# Patient Record
Sex: FEMALE | Race: WHITE | NOT HISPANIC OR LATINO | Employment: PART TIME | URBAN - METROPOLITAN AREA
[De-identification: names, ages, dates, MRNs, and addresses within clinical notes are randomized per-mention and may not be internally consistent; named-entity substitution may affect disease eponyms.]

---

## 2017-02-07 ENCOUNTER — CONVERSION ENCOUNTER (OUTPATIENT)
Dept: GENERAL RADIOLOGY | Facility: HOSPITAL | Age: 67
End: 2017-02-07

## 2018-07-27 ENCOUNTER — CONVERSION ENCOUNTER (OUTPATIENT)
Dept: GENERAL RADIOLOGY | Facility: HOSPITAL | Age: 68
End: 2018-07-27

## 2018-07-31 ENCOUNTER — OFFICE VISIT CONVERTED (OUTPATIENT)
Dept: SURGERY | Facility: CLINIC | Age: 68
End: 2018-07-31
Attending: SURGERY

## 2018-07-31 ENCOUNTER — CONVERSION ENCOUNTER (OUTPATIENT)
Dept: SURGERY | Facility: CLINIC | Age: 68
End: 2018-07-31

## 2019-09-25 ENCOUNTER — HOSPITAL ENCOUNTER (OUTPATIENT)
Dept: GENERAL RADIOLOGY | Facility: HOSPITAL | Age: 69
Discharge: HOME | End: 2019-09-25
Attending: SURGERY

## 2019-10-07 ENCOUNTER — OFFICE VISIT CONVERTED (OUTPATIENT)
Dept: SURGERY | Facility: CLINIC | Age: 69
End: 2019-10-07
Attending: SURGERY

## 2019-10-07 ENCOUNTER — CONVERSION ENCOUNTER (OUTPATIENT)
Dept: SURGERY | Facility: CLINIC | Age: 69
End: 2019-10-07

## 2020-06-02 ENCOUNTER — CONVERSION ENCOUNTER (OUTPATIENT)
Dept: ORTHOPEDIC SURGERY | Facility: CLINIC | Age: 70
End: 2020-06-02

## 2020-06-02 ENCOUNTER — OFFICE VISIT CONVERTED (OUTPATIENT)
Dept: ORTHOPEDIC SURGERY | Facility: CLINIC | Age: 70
End: 2020-06-02
Attending: ORTHOPAEDIC SURGERY

## 2020-09-28 ENCOUNTER — CONVERSION ENCOUNTER (OUTPATIENT)
Dept: ORTHOPEDIC SURGERY | Facility: CLINIC | Age: 70
End: 2020-09-28

## 2020-09-28 ENCOUNTER — OFFICE VISIT CONVERTED (OUTPATIENT)
Dept: ORTHOPEDIC SURGERY | Facility: CLINIC | Age: 70
End: 2020-09-28
Attending: ORTHOPAEDIC SURGERY

## 2020-10-05 ENCOUNTER — HOSPITAL ENCOUNTER (OUTPATIENT)
Dept: GENERAL RADIOLOGY | Facility: HOSPITAL | Age: 70
Discharge: HOME | End: 2020-10-05
Attending: SURGERY

## 2020-10-06 ENCOUNTER — HOSPITAL ENCOUNTER (OUTPATIENT)
Dept: PREADMISSION TESTING | Facility: HOSPITAL | Age: 70
Discharge: HOME | End: 2020-10-06
Attending: ORTHOPAEDIC SURGERY

## 2020-10-06 LAB
ALBUMIN SERPL-MCNC: 4 G/DL (ref 3.5–5)
ALBUMIN/GLOB SERPL: 1.3 {RATIO} (ref 1.4–2.6)
ALP SERPL-CCNC: 72 U/L (ref 43–160)
ALT SERPL-CCNC: 18 U/L (ref 10–40)
ANION GAP SERPL CALC-SCNC: 16 MMOL/L (ref 8–19)
APTT BLD: 25.5 S (ref 22.2–34.2)
AST SERPL-CCNC: 20 U/L (ref 15–50)
BASOPHILS # BLD AUTO: 0.11 10*3/UL (ref 0–0.2)
BASOPHILS NFR BLD AUTO: 1.1 % (ref 0–3)
BILIRUB SERPL-MCNC: 0.44 MG/DL (ref 0.2–1.3)
BUN SERPL-MCNC: 12 MG/DL (ref 5–25)
BUN/CREAT SERPL: 16 {RATIO} (ref 6–20)
CALCIUM SERPL-MCNC: 10.4 MG/DL (ref 8.7–10.4)
CHLORIDE SERPL-SCNC: 97 MMOL/L (ref 99–111)
CONV ABS IMM GRAN: 0.04 10*3/UL (ref 0–0.2)
CONV CO2: 29 MMOL/L (ref 22–32)
CONV IMMATURE GRAN: 0.4 % (ref 0–1.8)
CONV TOTAL PROTEIN: 7 G/DL (ref 6.3–8.2)
CREAT UR-MCNC: 0.77 MG/DL (ref 0.5–0.9)
DEPRECATED RDW RBC AUTO: 42.7 FL (ref 36.4–46.3)
EOSINOPHIL # BLD AUTO: 0.43 10*3/UL (ref 0–0.7)
EOSINOPHIL # BLD AUTO: 4.4 % (ref 0–7)
ERYTHROCYTE [DISTWIDTH] IN BLOOD BY AUTOMATED COUNT: 12.4 % (ref 11.7–14.4)
EST. AVERAGE GLUCOSE BLD GHB EST-MCNC: 123 MG/DL
GFR SERPLBLD BASED ON 1.73 SQ M-ARVRAT: >60 ML/MIN/{1.73_M2}
GLOBULIN UR ELPH-MCNC: 3 G/DL (ref 2–3.5)
GLUCOSE SERPL-MCNC: 118 MG/DL (ref 65–99)
HBA1C MFR BLD: 5.9 % (ref 3.5–5.7)
HCT VFR BLD AUTO: 41 % (ref 37–47)
HGB BLD-MCNC: 13.8 G/DL (ref 12–16)
INR PPP: 0.91 (ref 2–3)
LYMPHOCYTES # BLD AUTO: 2.08 10*3/UL (ref 1–5)
LYMPHOCYTES NFR BLD AUTO: 21.5 % (ref 20–45)
MCH RBC QN AUTO: 31.4 PG (ref 27–31)
MCHC RBC AUTO-ENTMCNC: 33.7 G/DL (ref 33–37)
MCV RBC AUTO: 93.2 FL (ref 81–99)
MONOCYTES # BLD AUTO: 1 10*3/UL (ref 0.2–1.2)
MONOCYTES NFR BLD AUTO: 10.3 % (ref 3–10)
NEUTROPHILS # BLD AUTO: 6.03 10*3/UL (ref 2–8)
NEUTROPHILS NFR BLD AUTO: 62.3 % (ref 30–85)
NRBC CBCN: 0 % (ref 0–0.7)
OSMOLALITY SERPL CALC.SUM OF ELEC: 289 MOSM/KG (ref 273–304)
PLATELET # BLD AUTO: 302 10*3/UL (ref 130–400)
PMV BLD AUTO: 10.1 FL (ref 9.4–12.3)
POTASSIUM SERPL-SCNC: 2.9 MMOL/L (ref 3.5–5.3)
PROTHROMBIN TIME: 10 S (ref 9.4–12)
RBC # BLD AUTO: 4.4 10*6/UL (ref 4.2–5.4)
SODIUM SERPL-SCNC: 139 MMOL/L (ref 135–147)
WBC # BLD AUTO: 9.69 10*3/UL (ref 4.8–10.8)

## 2020-10-22 ENCOUNTER — HOSPITAL ENCOUNTER (OUTPATIENT)
Dept: PREADMISSION TESTING | Facility: HOSPITAL | Age: 70
Discharge: HOME | End: 2020-10-22
Attending: ORTHOPAEDIC SURGERY

## 2020-10-24 LAB — SARS-COV-2 RNA SPEC QL NAA+PROBE: NOT DETECTED

## 2020-10-27 ENCOUNTER — HOSPITAL ENCOUNTER (OUTPATIENT)
Dept: PERIOP | Facility: HOSPITAL | Age: 70
Setting detail: HOSPITAL OUTPATIENT SURGERY
Discharge: HOME | End: 2020-10-28
Attending: INTERNAL MEDICINE

## 2020-10-28 LAB
ANION GAP SERPL CALC-SCNC: 15 MMOL/L (ref 8–19)
BUN SERPL-MCNC: 21 MG/DL (ref 5–25)
BUN/CREAT SERPL: 19 {RATIO} (ref 6–20)
CALCIUM SERPL-MCNC: 10.3 MG/DL (ref 8.7–10.4)
CHLORIDE SERPL-SCNC: 101 MMOL/L (ref 99–111)
CONV CO2: 27 MMOL/L (ref 22–32)
CREAT UR-MCNC: 1.13 MG/DL (ref 0.5–0.9)
GFR SERPLBLD BASED ON 1.73 SQ M-ARVRAT: 49 ML/MIN/{1.73_M2}
GLUCOSE SERPL-MCNC: 137 MG/DL (ref 65–99)
HCT VFR BLD AUTO: 37.1 % (ref 37–47)
HGB BLD-MCNC: 12.3 G/DL (ref 12–16)
OSMOLALITY SERPL CALC.SUM OF ELEC: 291 MOSM/KG (ref 273–304)
POTASSIUM SERPL-SCNC: 4.7 MMOL/L (ref 3.5–5.3)
SODIUM SERPL-SCNC: 138 MMOL/L (ref 135–147)

## 2020-11-09 ENCOUNTER — OFFICE VISIT CONVERTED (OUTPATIENT)
Dept: ORTHOPEDIC SURGERY | Facility: CLINIC | Age: 70
End: 2020-11-09
Attending: PHYSICIAN ASSISTANT

## 2020-12-07 ENCOUNTER — OFFICE VISIT CONVERTED (OUTPATIENT)
Dept: ORTHOPEDIC SURGERY | Facility: CLINIC | Age: 70
End: 2020-12-07
Attending: PHYSICIAN ASSISTANT

## 2021-01-08 ENCOUNTER — OFFICE VISIT CONVERTED (OUTPATIENT)
Dept: ORTHOPEDIC SURGERY | Facility: CLINIC | Age: 71
End: 2021-01-08
Attending: PHYSICIAN ASSISTANT

## 2021-02-22 ENCOUNTER — OFFICE VISIT CONVERTED (OUTPATIENT)
Dept: ORTHOPEDIC SURGERY | Facility: CLINIC | Age: 71
End: 2021-02-22
Attending: PHYSICIAN ASSISTANT

## 2021-05-10 NOTE — H&P
"   History and Physical      Patient Name: Darlene Monroe   Patient ID: [id number]   Sex: Female   YOB: 1950    Primary Care Provider: Cecily Marc   Referring Provider: Coleman Dyer MD    Visit Date: June 2, 2020    Provider: Yemi Coto MD   Location: [facility]   Location Address: [address] [place] [address]  [id number]   Location Phone: [phone]          Chief Complaint  · left knee pain      History Of Present Illness  Darlene Monroe is a 70 year old /White female who presents today to [facility]. Patient presents for evaluation of left knee pain, patient states she has had years of left knee pain but states it is worsened over the last 3 months. Patient states she has had injections in the past from a doctor in [place] several years ago with relief. Patient states she has had worse pain in the knee recently with pain \"interfering with life \". Patient states she takes Tylenol which does not help the pain. She has used a compression sleeve with some relief. Patient denies buckling catching or locking of the knee. Patient admits to medial knee pain, pain with stairs, pain with activities, going from sitting to standing. Patient admits to occasional swelling. Patient denies injury.       Past Medical History  Arthritis; Asthma; Breast CA; Hyperlipemia; Hypertension; Limb Swelling; Seasonal allergies; Shortness of Breath         Past Surgical History  Breast biopsy, both breasts; Cholecystectomy; Colonoscopy; Gallbladder         Medication List  atorvastatin oral; biotin 10,000 mcg oral tablet,disintegrating; cetirizine oral; chlorthalidone 25 mg oral tablet; Flonase Sensimist 27.5 mcg/actuation nasal spray,suspension; losartan-hydrochlorothiazide oral; One A Day Vitamin oral; ProAir HFA 90 mcg/actuation inhalation HFA aerosol inhaler; Singulair oral; vitamin B12-folic acid 500-400 mcg oral tablet; Vitamin D3 1,000 unit oral capsule         Allergy " "List  CT Contrast Dye; I.V. Dye       Allergies Reconciled  Family Medical History  Stroke; *Non Contributory; Osteoporosis         Social History  Alcohol Use (Never); Claustophobic (Unknown); lives alone; Recreational Drug Use (Never); Retired.; Single.; Tobacco (Never)         Review of Systems  · Constitutional  o Denies  o : fever, chills, weight loss  · Cardiovascular  o Denies  o : chest pain, shortness of breath  · Gastrointestinal  o Denies  o : liver disease, heartburn, nausea, blood in stools  · Genitourinary  o Denies  o : painful urination, blood in urine  · Integument  o Denies  o : rash, itching  · Neurologic  o Denies  o : headache, weakness, loss of consciousness  · Musculoskeletal  o Denies  o : painful, swollen joints  · Psychiatric  o Denies  o : drug/alcohol addiction, anxiety, depression      Vitals  Date Time BP Position Site L\R Cuff Size HR RR TEMP (F) WT  HT  BMI kg/m2 BSA m2 O2 Sat        06/02/2020 04:11 PM         210lbs 0oz 5'  2\" 38.41 2.04           Physical Examination  · Constitutional  o Appearance  o : well developed, well-nourished, no obvious deformities present  · Head and Face  o Head  o :   § Inspection  § : normocephalic  o Face  o :   § Inspection  § : no facial lesions  · Eyes  o Conjunctivae  o : conjunctivae normal  o Sclerae  o : sclerae white  · Ears, Nose, Mouth and Throat  o Ears  o :   § External Ears  § : appearance within normal limits  § Hearing  § : intact  o Nose  o :   § External Nose  § : appearance normal  · Neck  o Inspection/Palpation  o : normal appearance  o Range of Motion  o : full range of motion  · Respiratory  o Respiratory Effort  o : breathing unlabored  o Inspection of Chest  o : normal appearance  o Auscultation of Lungs  o : no audible wheezing or rales  · Cardiovascular  o Heart  o : regular rate  · Gastrointestinal  o Abdominal Examination  o : soft and non-tender  · Skin and Subcutaneous Tissue  o General Inspection  o : intact, no " rashes  · Psychiatric  o General  o : Alert and oriented x3  o Judgement and Insight  o : judgment and insight intact  o Mood and Affect  o : mood normal, affect appropriate  · Left Knee  o Inspection  o : Skin is intact, no redness, mild swelling, no ecchymosis, no signs of infection. Full extension, flexion 120, stable anterior/posterior drawer, stable to varus/valgus stress, pain with range of motion.  · Injection Note/Aspiration Note  o Site  o : left knee  o Procedure  o : Procedure: After educating the patient, patient gave consent for procedure. After using Chloraprep, the joint space was injected. The patient tolerated the procedure well.  o Medication  o : 80 mg of DepoMedrol with 9cc of 1% Lidocaine  · In Office Procedures  o View  o : AP/LATERAL/SUNRISE   o Site  o : left, knee  o Indication  o : Left knee pain   o Study  o : X-rays ordered, taken in the office, and reviewed today.  o Xray  o : Advanced degenerative changes, medial joint space narrowing, tricompartmental osteophytes.  o Comparative Data  o : No comparative data found          Assessment  · Primary osteoarthritis of left knee     715.16/M17.12  · Left knee pain     719.46/M25.562      Plan  · Orders  o Depo-Medrol injection 80mg () - 715.16/M17.12 - 06/03/2020   Lot [id number] Exp 05 2021 [employer] Administered by ROWAN PERALTA MD  o Knee Intra-articular Injection without US Guidance [facility] (40863) - 715.16/M17.12 - 06/03/2020   Lot [id number] Exp 07 01 2021 [employer] Administered by ROWAN PERALTA MD  · Medications  o Medications have been Reconciled  o Transition of Care or Provider Policy  · Instructions  o Reviewed the patient's Past Medical, Social, and Family history as well as the ROS at today's visit, no changes.  o Call or return if worsening symptoms.  o Follow up in 2 months.  o The above service was scribed by Hua Malone PA-C on my behalf and I attest to the accuracy of the note. jsb  o Reviewed x-rays with  patient, discussed diagnosis and treatment plan. Patient is candidate for left knee replacement, patient would like to avoid surgery. Patient elected to have left knee steroid injection and she tolerated it well. Follow-up in 8 weeks for reevaluation.            Electronically Signed by: Jaye Prescott - , Other -Author on June 5, 2020 02:35:40 PM  Electronically Co-signed by: Yemi Coto MD -Reviewer on June 7, 2020 09:01:20 PM

## 2021-05-13 NOTE — PROGRESS NOTES
Progress Note      Patient Name: Darlene Monroe   Patient ID: [id number]   Sex: Female   YOB: 1950    Primary Care Provider: Cecily Marc   Referring Provider: Coleamn Dyer MD    Visit Date: September 28, 2020    Provider: Varun Batista MD   Location: [facility]   Location Address: [address] [place] [address]  [id number]   Location Phone: [phone]          Chief Complaint  · Left Knee Pain      History Of Present Illness  Darlene Monroe is a 70 year old /White female who presents today to [facility].      Patient presents today with a chief complaint of left knee pain. Patient has a history of left knee OA and states that pain is affecting her activities of daily living. Patient states that she has pain with walking and bending of her knees. Patient states she takes Tylenol which does not help the pain. She has used a compression sleeve with some relief. Patient denies any trauma or injury to her knee.            Past Medical History  Arthritis; Asthma; Breast CA; Hyperlipemia; Hypertension; Limb Swelling; Seasonal allergies; Shortness of Breath         Past Surgical History  Breast biopsy, both breasts; Cholecystectomy; Colonoscopy; Gallbladder         Medication List  atorvastatin oral; biotin 10,000 mcg oral tablet,disintegrating; cetirizine oral; chlorthalidone 25 mg oral tablet; Flonase Sensimist 27.5 mcg/actuation nasal spray,suspension; losartan-hydrochlorothiazide oral; One A Day Vitamin oral; ProAir HFA 90 mcg/actuation inhalation HFA aerosol inhaler; Singulair oral; vitamin B12-folic acid 500-400 mcg oral tablet; Vitamin D3 1,000 unit oral capsule         Allergy List  CT Contrast Dye; I.V. Dye       Allergies Reconciled  Family Medical History  Stroke; *Non Contributory; Osteoporosis         Social History  Alcohol Use (Never); Claustophobic (Unknown); lives alone; Recreational Drug Use (Never); Retired.; Single.; Tobacco (Never)         Review  "of Systems  · Constitutional  o Denies  o : fever, chills, weight loss  · Cardiovascular  o Denies  o : chest pain, shortness of breath  · Gastrointestinal  o Denies  o : liver disease, heartburn, nausea, blood in stools  · Genitourinary  o Denies  o : painful urination, blood in urine  · Integument  o Denies  o : rash, itching  · Neurologic  o Denies  o : headache, weakness, loss of consciousness  · Musculoskeletal  o Denies  o : painful, swollen joints  · Psychiatric  o Denies  o : drug/alcohol addiction, anxiety, depression      Vitals  Date Time BP Position Site L\R Cuff Size HR RR TEMP (F) WT  HT  BMI kg/m2 BSA m2 O2 Sat        09/28/2020 09:01 AM         210lbs 0oz 5'  2\" 38.41 2.04           Physical Examination  · Constitutional  o Appearance  o : well developed, well-nourished, no obvious deformities present  · Head and Face  o Head  o :   § Inspection  § : normocephalic  o Face  o :   § Inspection  § : no facial lesions  · Eyes  o Conjunctivae  o : conjunctivae normal  o Sclerae  o : sclerae white  · Ears, Nose, Mouth and Throat  o Ears  o :   § External Ears  § : appearance within normal limits  § Hearing  § : intact  o Nose  o :   § External Nose  § : appearance normal  · Neck  o Inspection/Palpation  o : normal appearance  o Range of Motion  o : full range of motion  · Respiratory  o Respiratory Effort  o : breathing unlabored  o Inspection of Chest  o : normal appearance  o Auscultation of Lungs  o : no audible wheezing or rales  · Cardiovascular  o Heart  o : regular rate  · Gastrointestinal  o Abdominal Examination  o : soft and non-tender  · Skin and Subcutaneous Tissue  o General Inspection  o : intact, no rashes  · Psychiatric  o General  o : Alert and oriented x3  o Judgement and Insight  o : judgment and insight intact  o Mood and Affect  o : mood normal, affect appropriate  · Left Knee  o Inspection  o : Limping gait. Medial joint line tenderness. Lateral joint line tenderness. Full extension " and flexion. Positive for crepitus. Sensation grossly intact. Neurovascular intact. Pulses are pleasant. No swelling, skin discoloration or atrophy. Stable to valgus/varus stress. Good strength in quadriceps, hamstrings, dorsiflexors, and plantar flexors.   · Imaging  o Imaging  o : [XRAY 6/2/2020] Advanced degenerative changes, medial joint space narrowing, tricompartmental osteophytes.           Assessment  · Primary osteoarthritis of left knee     715.16/M17.12  · Left knee pain, unspecified chronicity     719.46/M25.562      Plan  · Medications  o Medications have been Reconciled  o Transition of Care or Provider Policy  · Instructions  o Dr. Batista saw and examined the patient and agrees with plan.   o X-rays reviewed by Dr. Batista.  o Reviewed the patient's Past Medical, Social, and Family history as well as the ROS at today's visit, no changes.  o Call or return if worsening symptoms.  o Discussed surgery.  o Risks/benefits discussed with patient including, but not limited to: infection, bleeding, neurovascular damage, malunion, nonunion, aesthetic deformity, need for further surgery, and death.  o Discussed with patient the implant type being used during surgery and patient understands and desires to proceed.  o Surgery pamphlet given.  o This note was transcribed by Candie Pham. [email or web address]  o Discussed diagnosis and treatment options with the patient. Discussed surgical interventions risks and benefits. Patient opted for left total knee arthroplasty. Patient will follow-up post-op.             Electronically Signed by: Candie Pham-, Other -Author on September 28, 2020 11:39:00 AM  Electronically Co-signed by: Varun Batista MD -Reviewer on September 28, 2020 11:47:16 AM

## 2021-05-13 NOTE — PROGRESS NOTES
Progress Note      Patient Name: Darlene Monroe   Patient ID: [id number]   Sex: Female   YOB: 1950    Primary Care Provider: Cecily Marc   Referring Provider: Varun Batista MD    Visit Date: November 9, 2020    Provider: Arti Gray PA-C   Location: [facility]   Location Address: [address] [place] [address]  [id number]   Location Phone: [phone]          Chief Complaint  · Left knee pain      History Of Present Illness  Darlene Monroe is a 70 year old /White female who presents today to [facility].      Patient is status post left total knee arthroplasty performed 10/27/20 by Dr. Batista. Patient states pain is manageable with OTC Tylenol. Patient denies calf pain. Patient states Home Health is providing physical therapy. Patient is using a walker.       Past Medical History  Arthritis; Asthma; Breast CA; Hyperlipemia; Hypertension; Limb Swelling; Seasonal allergies; Shortness of Breath         Past Surgical History  Breast biopsy, both breasts; Cholecystectomy; Colonoscopy; Gallbladder         Medication List  atorvastatin oral; biotin 10,000 mcg oral tablet,disintegrating; cetirizine oral; chlorthalidone 25 mg oral tablet; Flonase Sensimist 27.5 mcg/actuation nasal spray,suspension; losartan-hydrochlorothiazide oral; One A Day Vitamin oral; ProAir HFA 90 mcg/actuation inhalation HFA aerosol inhaler; Singulair oral; Tylenol-Codeine #3 300-30 mg oral tablet; vitamin B12-folic acid 500-400 mcg oral tablet; Vitamin D3 1,000 unit oral capsule         Allergy List  CT Contrast Dye; I.V. Dye         Family Medical History  Stroke; *Non Contributory; Osteoporosis         Social History  Alcohol Use (Never); Claustophobic (Unknown); lives alone; Recreational Drug Use (Never); Retired.; Single.; Tobacco (Never)         Review of Systems  · Constitutional  o Denies  o : fever, chills, weight loss  · Cardiovascular  o Denies  o : chest pain, shortness of  "breath  · Gastrointestinal  o Denies  o : liver disease, heartburn, nausea, blood in stools  · Genitourinary  o Denies  o : painful urination, blood in urine  · Integument  o Denies  o : rash, itching  · Neurologic  o Denies  o : headache, weakness, loss of consciousness  · Musculoskeletal  o Denies  o : painful, swollen joints  · Psychiatric  o Denies  o : drug/alcohol addiction, anxiety, depression      Vitals  Date Time BP Position Site L\R Cuff Size HR RR TEMP (F) WT  HT  BMI kg/m2 BSA m2 O2 Sat FR L/min FiO2 HC       11/09/2020 02:38 PM      67 - R   210lbs 0oz 5'  2\" 38.41 2.04 97 %            Physical Examination  · Constitutional  o Appearance  o : well developed, well-nourished, no obvious deformities present  · Head and Face  o Head  o :   § Inspection  § : normocephalic  o Face  o :   § Inspection  § : no facial lesions  · Eyes  o Conjunctivae  o : conjunctivae normal  o Sclerae  o : sclerae white  · Ears, Nose, Mouth and Throat  o Ears  o :   § External Ears  § : appearance within normal limits  § Hearing  § : intact  o Nose  o :   § External Nose  § : appearance normal  · Neck  o Inspection/Palpation  o : normal appearance  o Range of Motion  o : full range of motion  · Respiratory  o Respiratory Effort  o : breathing unlabored  o Inspection of Chest  o : normal appearance  o Auscultation of Lungs  o : no audible wheezing or rales  · Cardiovascular  o Heart  o : regular rate  · Gastrointestinal  o Abdominal Examination  o : soft and non-tender  · Skin and Subcutaneous Tissue  o General Inspection  o : intact, no rashes  · Psychiatric  o General  o : Alert and oriented x3  o Judgement and Insight  o : judgment and insight intact  o Mood and Affect  o : mood normal, affect appropriate  · In Office Procedures  o View  o : AP/LATERAL/SUNRISE  o Site  o : left, knee  o Indication  o : Left knee pain  o Study  o : X-rays ordered, taken in the office, and reviewed today.  o Xray  o : stable " implant  o Comparative Data  o : Comparative Data found and reviewed today   · Left Knee-Leslie  o Inspection  o : incision well healed without signs of infection, limping gait, weight bearing, swelling, no ecchymosis, no atrophy, neutral alignment  o Palpation  o : no medial joint line tenderness, no lateral joint line tenderness, no patellar tendon tenderness, no pain of MCL, no pain at LCL  o ROM  o : full extension, full flexion  o Strength  o : weak extension, weak flexion   o Special Tests  o : negative Hunter's sign  o Neurovascular  o : Full sensation, Dorsal Pedal Pulse 2+, posteriror tibialis pulse 2+          Assessment  · Aftercare;following joint replacement     V54.81/Z47.1  · Left knee pain, unspecified chronicity     719.46/M25.562      Plan  · Orders  o Knee (Left) [facility] ([id number]) - 719.46/M25.562 - 11/09/2020  · Medications  o Medications have been Reconciled  o Transition of Care or Provider Policy  · Instructions  o Staples removed in clinic today.  o Reviewed the patient's Past Medical, Social, and Family history as well as the ROS at today's visit, no changes.  o Call or return if worsening symptoms.  o X-ray ordered, taken and reviewed at this visit.  o Follow Up in 4 weeks.   o Electronically Identified Patient Education Materials Provided Electronically            Electronically Signed by: APRIL Ling-C -Author on November 9, 2020 03:19:08 PM  Electronically Co-signed by: Varun Batista MD -Reviewer on November 11, 2020 08:53:27 PM

## 2021-05-13 NOTE — PROGRESS NOTES
Progress Note      Patient Name: Darlene Monroe   Patient ID: [id number]   Sex: Female   YOB: 1950    Primary Care Provider: Cecily Marc    Visit Date: December 7, 2020    Provider: Arti Gray PA-C   Location: [facility]   Location Address: [address] [place] [address]  [id number]   Location Phone: [phone]          Chief Complaint  · Left Total Knee Arthroplasty      History Of Present Illness  Darlene Monroe is a 70 year old /White female who presents today to [facility].      Patient is status post left total knee arthroplasty performed 10/27/20 by Dr. Batista. Patient states pain is manageable with OTC Tylenol. Patient denies calf pain. Patient states Home Health is providing physical therapy. Patient is using a walker.            Past Medical History  Arthritis; Asthma; Breast CA; Hyperlipemia; Hypertension; Limb Swelling; Seasonal allergies; Shortness of Breath         Past Surgical History  Breast biopsy, both breasts; Cholecystectomy; Colonoscopy; Gallbladder         Medication List  atorvastatin oral; biotin 10,000 mcg oral tablet,disintegrating; cetirizine oral; chlorthalidone 25 mg oral tablet; Flonase Sensimist 27.5 mcg/actuation nasal spray,suspension; losartan-hydrochlorothiazide oral; One A Day Vitamin oral; ProAir HFA 90 mcg/actuation inhalation HFA aerosol inhaler; Singulair oral; Tylenol-Codeine #3 300-30 mg oral tablet; vitamin B12-folic acid 500-400 mcg oral tablet; Vitamin D3 1,000 unit oral capsule         Allergy List  CT Contrast Dye; I.V. Dye         Family Medical History  Stroke; *Non Contributory; Osteoporosis         Social History  Alcohol Use (Never); Claustophobic (Unknown); lives alone; Recreational Drug Use (Never); Retired.; Single.; Tobacco (Never)         Review of Systems  · Constitutional  o Denies  o : fever, chills, weight loss  · Cardiovascular  o Denies  o : chest pain, shortness of  "breath  · Gastrointestinal  o Denies  o : liver disease, heartburn, nausea, blood in stools  · Genitourinary  o Denies  o : painful urination, blood in urine  · Integument  o Denies  o : rash, itching  · Neurologic  o Denies  o : headache, weakness, loss of consciousness  · Musculoskeletal  o Denies  o : painful, swollen joints  · Psychiatric  o Denies  o : drug/alcohol addiction, anxiety, depression      Vitals  Date Time BP Position Site L\R Cuff Size HR RR TEMP (F) WT  HT  BMI kg/m2 BSA m2 O2 Sat FR L/min FiO2 HC       12/07/2020 02:35 PM         210lbs 0oz 5'  2\" 38.41 2.04             Physical Examination  · Constitutional  o Appearance  o : well developed, well-nourished, no obvious deformities present  · Head and Face  o Head  o :   § Inspection  § : normocephalic  o Face  o :   § Inspection  § : no facial lesions  · Eyes  o Conjunctivae  o : conjunctivae normal  o Sclerae  o : sclerae white  · Ears, Nose, Mouth and Throat  o Ears  o :   § External Ears  § : appearance within normal limits  § Hearing  § : intact  o Nose  o :   § External Nose  § : appearance normal  · Neck  o Inspection/Palpation  o : normal appearance  o Range of Motion  o : full range of motion  · Respiratory  o Respiratory Effort  o : breathing unlabored  o Inspection of Chest  o : normal appearance  o Auscultation of Lungs  o : no audible wheezing or rales  · Cardiovascular  o Heart  o : regular rate  · Gastrointestinal  o Abdominal Examination  o : soft and non-tender  · Skin and Subcutaneous Tissue  o General Inspection  o : intact, no rashes  · Psychiatric  o General  o : Alert and oriented x3  o Judgement and Insight  o : judgment and insight intact  o Mood and Affect  o : mood normal, affect appropriate  · Left Knee-Street  o Inspection  o : scar well healed, limping gait, weight bearing, mild swelling, no ecchymosis, no atrophy, neutral alignment  o Palpation  o : no medial joint line tenderness, no lateral joint line tenderness, no " patellar tendon tenderness, no pain of MCL, no pain at LCL  o ROM  o : -5 extension, 95 flexion  o Strength  o : weak extension, weak flexion   o Special Tests  o : negative varus stress, negaitve valgus stress  o Neurovascular  o : Full sensation, Dorsal Pedal Pulse 2+, posteriror tibialis pulse 2+          Assessment  · Aftercare following left knee joint replacement surgery       Aftercare following joint replacement surgery     V54.81/Z47.1  Presence of left artificial knee joint     V54.81/Z96.652      Plan  · Medications  o Medications have been Reconciled  o Transition of Care or Provider Policy  · Instructions  o Reviewed the patient's Past Medical, Social, and Family history as well as the ROS at today's visit, no changes.  o Call or return if worsening symptoms.  o Follow Up in 4 weeks.   o Electronically Identified Patient Education Materials Provided Electronically     Follow up x ray  continue home health  Prescribed Flexeril 10mg one QHS #30  Prescribed Meloxicam 15mg one po Daily #30 with food             Electronically Signed by: Arti Gray PA-C -Author on December 7, 2020 02:53:27 PM

## 2021-05-14 VITALS — WEIGHT: 216.25 LBS | HEART RATE: 68 BPM | OXYGEN SATURATION: 98 % | BODY MASS INDEX: 39.79 KG/M2 | HEIGHT: 62 IN

## 2021-05-14 VITALS — HEIGHT: 62 IN | WEIGHT: 210 LBS | BODY MASS INDEX: 38.64 KG/M2

## 2021-05-14 VITALS — WEIGHT: 210 LBS | BODY MASS INDEX: 38.64 KG/M2 | HEIGHT: 62 IN

## 2021-05-14 VITALS — HEIGHT: 62 IN | OXYGEN SATURATION: 97 % | HEART RATE: 60 BPM | BODY MASS INDEX: 40.16 KG/M2 | WEIGHT: 218.25 LBS

## 2021-05-14 VITALS — WEIGHT: 210 LBS | HEART RATE: 67 BPM | BODY MASS INDEX: 38.64 KG/M2 | OXYGEN SATURATION: 97 % | HEIGHT: 62 IN

## 2021-05-14 NOTE — PROGRESS NOTES
Progress Note      Patient Name: Darlene Monroe   Patient ID: [id number]   Sex: Female   YOB: 1950    Primary Care Provider: Cecily Marc    Visit Date: January 8, 2021    Provider: Arti Gray PA-C   Location: [facility]   Location Address: [address] [place] [address]  [id number]   Location Phone: [phone]          Chief Complaint  · Left knee pain       History Of Present Illness  Darlene Monroe is a 70 year old /White female who presents today to [facility].      Patient is status post left total knee arthroplasty performed 10/27/20 by Dr. Batista. Patient states pain is manageable with OTC Tylenol. Patient states Home Health is providing physical therapy. Patient states mild pain on the medial side of the knee. Patient is using a cane.              Past Medical History  Arthritis; Asthma; Breast CA; Hyperlipemia; Hypertension; Limb Swelling; Seasonal allergies; Shortness of Breath         Past Surgical History  Breast biopsy, both breasts; Cholecystectomy; Colonoscopy; Gallbladder         Medication List  atorvastatin oral; biotin 10,000 mcg oral tablet,disintegrating; cetirizine oral; chlorthalidone 25 mg oral tablet; cyclobenzaprine 10 mg oral tablet; Flonase Sensimist 27.5 mcg/actuation nasal spray,suspension; losartan-hydrochlorothiazide oral; meloxicam 15 mg oral tablet; One A Day Vitamin oral; ProAir HFA 90 mcg/actuation inhalation HFA aerosol inhaler; Singulair oral; Tylenol-Codeine #3 300-30 mg oral tablet; vitamin B12-folic acid 500-400 mcg oral tablet; Vitamin D3 1,000 unit oral capsule         Allergy List  CT Contrast Dye; I.V. Dye         Family Medical History  Stroke; *Non Contributory; Osteoporosis         Social History  Alcohol Use (Never); Claustophobic (Unknown); lives alone; Recreational Drug Use (Never); Retired.; Single.; Tobacco (Never)         Review of Systems  · Constitutional  o Denies  o : fever, chills, weight  "loss  · Cardiovascular  o Denies  o : chest pain, shortness of breath  · Gastrointestinal  o Denies  o : liver disease, heartburn, nausea, blood in stools  · Genitourinary  o Denies  o : painful urination, blood in urine  · Integument  o Denies  o : rash, itching  · Neurologic  o Denies  o : headache, weakness, loss of consciousness  · Musculoskeletal  o Denies  o : painful, swollen joints  · Psychiatric  o Denies  o : drug/alcohol addiction, anxiety, depression      Vitals  Date Time BP Position Site L\R Cuff Size HR RR TEMP (F) WT  HT  BMI kg/m2 BSA m2 O2 Sat FR L/min FiO2 HC       01/08/2021 01:41 PM      68 - R   216lbs 4oz 5'  2.5\" 38.92 2.08 98 %            Physical Examination  · Constitutional  o Appearance  o : well developed, well-nourished, no obvious deformities present  · Head and Face  o Head  o :   § Inspection  § : normocephalic  o Face  o :   § Inspection  § : no facial lesions  · Eyes  o Conjunctivae  o : conjunctivae normal  o Sclerae  o : sclerae white  · Ears, Nose, Mouth and Throat  o Ears  o :   § External Ears  § : appearance within normal limits  § Hearing  § : intact  o Nose  o :   § External Nose  § : appearance normal  · Neck  o Inspection/Palpation  o : normal appearance  o Range of Motion  o : full range of motion  · Respiratory  o Respiratory Effort  o : breathing unlabored  o Inspection of Chest  o : normal appearance  o Auscultation of Lungs  o : no audible wheezing or rales  · Cardiovascular  o Heart  o : regular rate  · Gastrointestinal  o Abdominal Examination  o : soft and non-tender  · Skin and Subcutaneous Tissue  o General Inspection  o : intact, no rashes  · Psychiatric  o General  o : Alert and oriented x3  o Judgement and Insight  o : judgment and insight intact  o Mood and Affect  o : mood normal, affect appropriate  · In Office Procedures  o View  o : AP/LATERAL/SUNRISE  o Site  o : left, knee  o Indication  o : Left knee pain  o Study  o : X-rays ordered, taken in the " office, and reviewed today.  o Xray  o : stable implant  o Comparative Data  o : Comparative Data found and reviewed today   · Left Knee-Street  o Inspection  o : scar well healed, limping gait, weight bearing, no swelling, no ecchymosis, no atrophy, neutral alignment  o Palpation  o : no medial joint line tenderness, no lateral joint line tenderness, no patellar tendon tenderness, no pain of MCL, no pain at LCL  o ROM  o : full extension, full flexion  o Strength  o : weak extension, weak flexion   o Special Tests  o : negative varus stress, negaitve valgus stress  o Neurovascular  o : Full sensation, Dorsal Pedal Pulse 2+, posteriror tibialis pulse 2+          Assessment  · Aftercare;following joint replacement     V54.81/Z47.1  · Left knee pain, unspecified chronicity     719.46/M25.562      Plan  · Orders  o Knee (Left) [facility] Preferred View ([id number]) - 719.46/M25.562 - 01/08/2021  · Medications  o Medications have been Reconciled  o Transition of Care or Provider Policy  · Instructions  o Reviewed the patient's Past Medical, Social, and Family history as well as the ROS at today's visit, no changes.  o Call or return if worsening symptoms.  o Exercise handout given.  o X-ray ordered, taken and reviewed at this visit.  o Follow up in 6 weeks.  o Electronically Identified Patient Education Materials Provided Electronically     Continue [facility] for physical therapy               Electronically Signed by: APRIL Ling-VERN -Author on January 8, 2021 02:08:34 PM  Electronically Co-signed by: Varun Batista MD -Reviewer on January 11, 2021 10:37:47 PM

## 2021-05-14 NOTE — PROGRESS NOTES
Progress Note      Patient Name: Darlene Monroe   Patient ID: [id number]   Sex: Female   YOB: 1950    Primary Care Provider: Cecily Marc    Visit Date: February 22, 2021    Provider: Arti Gray PA-C   Location: [facility]   Location Address: [address] [place] [address]  [id number]   Location Phone: [phone]          Chief Complaint  · left knee pain      History Of Present Illness  Darlene Monroe is a 71 year old /White female who presents today to [facility].      Patient is status post left total knee arthroplasty performed 10/27/20 by Dr. Batista. Patient states mild pain on the lateral side of the knee after biking. Patient is using a cane.                Past Medical History  Arthritis; Asthma; Breast CA; Hyperlipemia; Hypertension; Limb Swelling; Seasonal allergies; Shortness of Breath         Past Surgical History  Breast biopsy, both breasts; Cholecystectomy; Colonoscopy; Gallbladder         Medication List  atorvastatin oral; biotin 10,000 mcg oral tablet,disintegrating; cetirizine oral; chlorthalidone 25 mg oral tablet; cyclobenzaprine 10 mg oral tablet; Flonase Sensimist 27.5 mcg/actuation nasal spray,suspension; losartan-hydrochlorothiazide oral; meloxicam 15 mg oral tablet; One A Day Vitamin oral; ProAir HFA 90 mcg/actuation inhalation HFA aerosol inhaler; Singulair oral; Tylenol-Codeine #3 300-30 mg oral tablet; vitamin B12-folic acid 500-400 mcg oral tablet; Vitamin D3 1,000 unit oral capsule         Allergy List  CT Contrast Dye; I.V. Dye         Family Medical History  Stroke; *Non Contributory; Osteoporosis         Social History  Alcohol Use (Never); Claustophobic (Unknown); lives alone; Recreational Drug Use (Never); Retired.; Single.; Tobacco (Never)         Review of Systems  · Constitutional  o Denies  o : fever, chills, weight loss  · Cardiovascular  o Denies  o : chest pain, shortness of  "breath  · Gastrointestinal  o Denies  o : liver disease, heartburn, nausea, blood in stools  · Genitourinary  o Denies  o : painful urination, blood in urine  · Integument  o Denies  o : rash, itching  · Neurologic  o Denies  o : headache, weakness, loss of consciousness  · Musculoskeletal  o Denies  o : painful, swollen joints  · Psychiatric  o Denies  o : drug/alcohol addiction, anxiety, depression      Vitals  Date Time BP Position Site L\R Cuff Size HR RR TEMP (F) WT  HT  BMI kg/m2 BSA m2 O2 Sat FR L/min FiO2 HC       02/22/2021 10:20 AM      60 - R   218lbs 4oz 5'  2\" 39.92 2.08 97 %            Physical Examination  · Constitutional  o Appearance  o : well developed, well-nourished, no obvious deformities present  · Head and Face  o Head  o :   § Inspection  § : normocephalic  o Face  o :   § Inspection  § : no facial lesions  · Eyes  o Conjunctivae  o : conjunctivae normal  o Sclerae  o : sclerae white  · Ears, Nose, Mouth and Throat  o Ears  o :   § External Ears  § : appearance within normal limits  § Hearing  § : intact  o Nose  o :   § External Nose  § : appearance normal  · Neck  o Inspection/Palpation  o : normal appearance  o Range of Motion  o : full range of motion  · Respiratory  o Respiratory Effort  o : breathing unlabored  o Inspection of Chest  o : normal appearance  o Auscultation of Lungs  o : no audible wheezing or rales  · Cardiovascular  o Heart  o : regular rate  · Gastrointestinal  o Abdominal Examination  o : soft and non-tender  · Skin and Subcutaneous Tissue  o General Inspection  o : intact, no rashes  · Psychiatric  o General  o : Alert and oriented x3  o Judgement and Insight  o : judgment and insight intact  o Mood and Affect  o : mood normal, affect appropriate  · In Office Procedures  o View  o : AP/LATERAL/SUNRISE  o Site  o : left, knee  o Indication  o : left knee pain  o Study  o : X-rays ordered, taken in the office, and reviewed today.  o Xray  o : stable " implant  o Comparative Data  o : Comparative Data found and reviewed today  · Left Knee-[employer]  o Inspection  o : scar well healed, no limping gait, weight bearing, no swelling, no ecchymosis, no atrophy, neutral alignment  o Palpation  o : tenderness at IT Band, no medial joint line tenderness, no lateral joint line tenderness, no patellar tendon tenderness, no pain of MCL, no pain at LCL  o ROM  o : full extension, full flexion  o Strength  o : full extension, full flexion  o Special Tests  o : negative varus stress, negaitve valgus stress  o Neurovascular  o : Full sensation, Dorsal Pedal Pulse 2+, posteriror tibialis pulse 2+          Assessment  · Aftercare;following joint replacement     V54.81/Z47.1  · Pain: Knee     719.46/M25.569  · Iliotibial band tendinitis of left side     728.89/M76.32      Plan  · Orders  o Knee (Left) [facility] Preferred View ([id number]) - 719.46/M25.569 - 02/22/2021  · Medications  o Medications have been Reconciled  o Transition of Care or Provider Policy  · Instructions  o Reviewed the patient's Past Medical, Social, and Family history as well as the ROS at today's visit, no changes.  o Call or return if worsening symptoms.  o Exercise handout given.  o X-ray ordered, taken and reviewed at this visit.  o Electronically Identified Patient Education Materials Provided Electronically     Follow up 1 year, x ray  Educated Patient on stretches for IT Band             Electronically Signed by: APRIL Ling-C -Author on February 22, 2021 10:57:52 AM  Electronically Co-signed by: Varun Batista MD -Reviewer on February 22, 2021 09:53:07 PM

## 2021-05-15 VITALS — BODY MASS INDEX: 38.64 KG/M2 | WEIGHT: 210 LBS | HEIGHT: 62 IN

## 2021-05-15 VITALS — RESPIRATION RATE: 14 BRPM | BODY MASS INDEX: 36.69 KG/M2 | WEIGHT: 199.37 LBS | HEIGHT: 62 IN

## 2021-05-16 VITALS — BODY MASS INDEX: 39.38 KG/M2 | RESPIRATION RATE: 14 BRPM | WEIGHT: 214 LBS | HEIGHT: 62 IN

## 2021-06-16 RX ORDER — CYCLOBENZAPRINE HCL 10 MG
TABLET ORAL
COMMUNITY
Start: 2020-12-07

## 2021-06-16 RX ORDER — MELOXICAM 15 MG/1
TABLET ORAL
COMMUNITY
Start: 2020-12-07

## 2021-06-16 RX ORDER — CHLORTHALIDONE 25 MG/1
TABLET ORAL
COMMUNITY

## 2021-06-16 RX ORDER — ALBUTEROL SULFATE 90 UG/1
AEROSOL, METERED RESPIRATORY (INHALATION)
COMMUNITY

## 2021-06-16 RX ORDER — AMOXICILLIN 500 MG/1
CAPSULE ORAL
COMMUNITY
Start: 2021-02-22

## 2021-06-16 RX ORDER — FLUTICASONE FUROATE 27.5 UG/1
SPRAY, METERED NASAL
COMMUNITY

## 2021-06-21 ENCOUNTER — OFFICE VISIT (OUTPATIENT)
Dept: SURGERY | Facility: CLINIC | Age: 71
End: 2021-06-21

## 2021-06-21 VITALS — HEIGHT: 62 IN | WEIGHT: 220 LBS | BODY MASS INDEX: 40.48 KG/M2 | RESPIRATION RATE: 16 BRPM

## 2021-06-21 DIAGNOSIS — Z12.39 BREAST SCREENING: Primary | ICD-10-CM

## 2021-06-21 DIAGNOSIS — Z12.31 ENCOUNTER FOR SCREENING MAMMOGRAM FOR MALIGNANT NEOPLASM OF BREAST: ICD-10-CM

## 2021-06-21 PROBLEM — J45.909 ASTHMA: Status: ACTIVE | Noted: 2021-06-21

## 2021-06-21 PROBLEM — Z85.3 HISTORY OF BREAST CANCER: Status: RESOLVED | Noted: 2021-06-21 | Resolved: 2021-06-21

## 2021-06-21 PROBLEM — C50.919 BREAST CA (HCC): Status: ACTIVE | Noted: 2021-06-21

## 2021-06-21 PROBLEM — I10 HYPERTENSION: Status: ACTIVE | Noted: 2021-06-21

## 2021-06-21 PROBLEM — J30.2 SEASONAL ALLERGIC RHINITIS: Status: ACTIVE | Noted: 2021-06-21

## 2021-06-21 PROBLEM — C50.919 BREAST CA (HCC): Status: RESOLVED | Noted: 2021-06-21 | Resolved: 2021-06-21

## 2021-06-21 PROBLEM — R06.02 SHORTNESS OF BREATH: Status: ACTIVE | Noted: 2021-06-21

## 2021-06-21 PROBLEM — Z85.3 HISTORY OF BREAST CANCER: Status: ACTIVE | Noted: 2021-06-21

## 2021-06-21 PROBLEM — M79.89 LIMB SWELLING: Status: ACTIVE | Noted: 2021-06-21

## 2021-06-21 PROBLEM — E78.5 HYPERLIPEMIA: Status: ACTIVE | Noted: 2021-06-21

## 2021-06-21 PROBLEM — M19.90 ARTHRITIS: Status: ACTIVE | Noted: 2021-06-21

## 2021-06-21 PROCEDURE — 99212 OFFICE O/P EST SF 10 MIN: CPT | Performed by: SURGERY

## 2021-06-21 RX ORDER — HYDROCHLOROTHIAZIDE 12.5 MG/1
12.5 TABLET ORAL DAILY
COMMUNITY
Start: 2021-05-24

## 2021-06-21 RX ORDER — LOSARTAN POTASSIUM 50 MG/1
50 TABLET ORAL DAILY
COMMUNITY
Start: 2021-04-20

## 2021-06-21 RX ORDER — FLUTICASONE PROPIONATE 50 MCG
2 SPRAY, SUSPENSION (ML) NASAL DAILY
COMMUNITY
Start: 2021-06-10

## 2021-06-21 RX ORDER — ATORVASTATIN CALCIUM 10 MG/1
10 TABLET, FILM COATED ORAL
COMMUNITY
Start: 2021-03-25

## 2021-06-21 RX ORDER — LOSARTAN POTASSIUM 100 MG/1
100 TABLET ORAL DAILY
COMMUNITY
Start: 2021-04-30

## 2021-06-21 RX ORDER — MONTELUKAST SODIUM 10 MG/1
TABLET ORAL
COMMUNITY
Start: 2021-06-10

## 2021-06-21 NOTE — PROGRESS NOTES
Annual breast screening    Darlene Monroe is an 71 y.o. female  who presents for annual breast screening referred by Cecily Marc MD.  Her mammogram is shown below and appeared free of any suspicious masses or lesions.  No new concerning lesions on exam.    Mammogram findings:  --------------------------------------------------------------------------------------------------------------------  PROCEDURE: DIGITAL SCREENING BILATERAL MAMOGRAM WITH 3D TOMOSYNTHESIS         COMPARISON: [facility], [place], DIG SCREENING BILAT ARTEM W 3D NANCY, 7/27/2018,     13:34.  [facility], [place], DIGITAL SCREENING W/CAD, 2/07/2017, 11:32.      [facility], [place], DIGITAL SCREENING W/CAD, 12/21/2015, 8:34.  [facility], [place], DIG SCREENING BILAT ARTEM W 3D NANCY, 9/25/2019, 9:33.         VIEWS:  BILATERAL CC AND MLO VIEWS WERE OBTAINED UTILIZING 3D TOMOSYNTHESIS AND R2 CAD SOFTWARE         INDICATIONS: SCREENING         FINDINGS:         No suspicious mass, area of architectural distortion or suspicious microcalcification is     identified.          CONCLUSION:         Benign mammogram. Suggest routine mammographic screening.         RECOMMENDATION(S):      ROUTINE MAMMOGRAM AND CLINICAL EVALUATION IN 12 MONTHS.           BIRADS:      DIAGNOSTIC CATEGORY 1--NEGATIVE.        Past Medical History:   Diagnosis Date   • Arthritis    • Asthma    • Breast CA (CMS/HCC)    • Hyperlipemia    • Hypertension    • Limb swelling    • Seasonal allergies    • Shortness of breath      Past Surgical History:   Procedure Laterality Date   • BREAST BIOPSY Bilateral    • CHOLECYSTECTOMY      laporscopic   • COLONOSCOPY     • GALLBLADDER SURGERY       Family History   Problem Relation Age of Onset   • Osteoporosis Mother    • Stroke Father      Social History     Tobacco Use   • Smoking status: Never Smoker   Substance Use Topics   • Alcohol use: Never   • Drug use: Not on file  "          Review of Systems   Constitutional: Negative for fatigue and fever.   HENT: Negative for sore throat and trouble swallowing.    Eyes: Negative for pain and visual disturbance.   Respiratory: Negative for cough and shortness of breath.    Cardiovascular: Negative for chest pain and leg swelling.   Gastrointestinal: Negative for abdominal pain and vomiting.   Endocrine: Negative for cold intolerance and heat intolerance.   Musculoskeletal: Negative for arthralgias and joint swelling.   Skin: Negative for rash and wound.   Neurological: Negative for speech difficulty and numbness.   Psychiatric/Behavioral: Negative for confusion. The patient is not nervous/anxious.        Vitals:    06/21/21 0831   Resp: 16   Weight: 99.8 kg (220 lb)   Height: 157.5 cm (62\")       Physical Exam  Vitals and nursing note reviewed.   Constitutional:       Appearance: Normal appearance.   HENT:      Head: Normocephalic and atraumatic.   Eyes:      Extraocular Movements: Extraocular movements intact.      Pupils: Pupils are equal, round, and reactive to light.   Cardiovascular:      Pulses: Normal pulses.   Pulmonary:      Effort: Pulmonary effort is normal. No accessory muscle usage or respiratory distress.   Chest:      Breasts:         Right: Normal. No inverted nipple, mass, nipple discharge or skin change.         Left: Normal. No inverted nipple, mass, nipple discharge or skin change.   Abdominal:      General: Abdomen is flat.      Palpations: Abdomen is soft.      Tenderness: There is no abdominal tenderness. There is no guarding.   Musculoskeletal:         General: No swelling, tenderness or deformity.      Cervical back: Neck supple.   Lymphadenopathy:      Upper Body:      Right upper body: No supraclavicular or axillary adenopathy.      Left upper body: No supraclavicular or axillary adenopathy.   Skin:     General: Skin is warm and dry.   Neurological:      General: No focal deficit present.      Mental Status: She is " alert and oriented to person, place, and time.   Psychiatric:         Mood and Affect: Mood normal.         Thought Content: Thought content normal.             Diagnoses and all orders for this visit:    1. Breast screening (Primary)  Assessment & Plan:  Return to routine screening with mammogram in October.    Orders:  -     Mammo Screening Bilateral With CAD; Future    2. Encounter for screening mammogram for malignant neoplasm of breast   -     Mammo Screening Bilateral With CAD; Future

## 2021-06-26 ENCOUNTER — TRANSCRIBE ORDERS (OUTPATIENT)
Dept: ADMINISTRATIVE | Facility: HOSPITAL | Age: 71
End: 2021-06-26

## 2021-06-26 DIAGNOSIS — Z12.31 ENCOUNTER FOR SCREENING MAMMOGRAM FOR BREAST CANCER: Primary | ICD-10-CM

## 2021-10-20 ENCOUNTER — HOSPITAL ENCOUNTER (OUTPATIENT)
Dept: MAMMOGRAPHY | Facility: HOSPITAL | Age: 71
Discharge: HOME | End: 2021-10-20
Admitting: SURGERY

## 2021-10-20 DIAGNOSIS — Z12.31 ENCOUNTER FOR SCREENING MAMMOGRAM FOR BREAST CANCER: ICD-10-CM

## 2021-10-20 PROCEDURE — 77063 BREAST TOMOSYNTHESIS BI: CPT

## 2021-10-20 PROCEDURE — 77067 SCR MAMMO BI INCL CAD: CPT

## 2022-09-19 ENCOUNTER — TRANSCRIBE ORDERS (OUTPATIENT)
Dept: ADMINISTRATIVE | Facility: HOSPITAL | Age: 72
End: 2022-09-19

## 2022-09-19 ENCOUNTER — TRANSCRIBE ORDERS (OUTPATIENT)
Dept: MAMMOGRAPHY | Facility: HOSPITAL | Age: 72
End: 2022-09-19

## 2022-09-19 DIAGNOSIS — Z12.31 SCREENING MAMMOGRAM FOR BREAST CANCER: Primary | ICD-10-CM

## 2022-09-19 DIAGNOSIS — Z12.31 SCREENING MAMMOGRAM, ENCOUNTER FOR: Primary | ICD-10-CM

## 2022-09-21 ENCOUNTER — TRANSCRIBE ORDERS (OUTPATIENT)
Dept: ADMINISTRATIVE | Facility: HOSPITAL | Age: 72
End: 2022-09-21

## 2022-09-21 DIAGNOSIS — Z12.31 SCREENING MAMMOGRAM, ENCOUNTER FOR: Primary | ICD-10-CM

## 2022-11-15 ENCOUNTER — HOSPITAL ENCOUNTER (OUTPATIENT)
Dept: MAMMOGRAPHY | Facility: HOSPITAL | Age: 72
Discharge: HOME | End: 2022-11-15
Admitting: FAMILY MEDICINE

## 2022-11-15 DIAGNOSIS — Z12.31 SCREENING MAMMOGRAM, ENCOUNTER FOR: ICD-10-CM

## 2022-11-15 PROCEDURE — 77067 SCR MAMMO BI INCL CAD: CPT

## 2022-11-15 PROCEDURE — 77063 BREAST TOMOSYNTHESIS BI: CPT

## 2023-01-06 RX ORDER — AMOXICILLIN AND CLAVULANATE POTASSIUM 875; 125 MG/1; MG/1
TABLET, FILM COATED ORAL
COMMUNITY
Start: 2022-10-31

## 2023-01-06 RX ORDER — FEBUXOSTAT 40 MG/1
40 TABLET, FILM COATED ORAL DAILY
COMMUNITY
Start: 2022-10-10

## 2023-01-06 RX ORDER — PREDNISONE 20 MG/1
TABLET ORAL
COMMUNITY
Start: 2022-10-31

## 2023-01-06 RX ORDER — PREDNISONE 50 MG/1
50 TABLET ORAL
COMMUNITY
Start: 2022-05-31

## 2023-01-08 NOTE — PROGRESS NOTES
Annual breast screening    Darlene Monroe is an 72 y.o. female  who presents for annual breast screening referred by No ref. provider found.  Her mammogram is shown below and appeared free of any suspicious masses or lesions.  No new concerning lesions on exam.       Narrative & Impression   PROCEDURE:  MAMMO SCREENING DIGITAL TOMOSYNTHESIS BILATERAL W CAD     COMPARISON: [facility], [place], DIG SCREENING BILAT ARTEM W 3D NANCY, 10/05/2020,   12:23.  [facility], [place], DIG SCREENING BILAT ARTEM W 3D NANCY, 9/25/2019, 9:33.    [facility], [place], DIG SCREENING BILAT ARTEM W 3D NANCY, 7/27/2018, 13:34.    [facility], [place], MAMMO SCREENING DIGITAL TOMOSYNTHESIS BILATERAL W CAD,   10/20/2021, 9:03.     VIEWS:  BILATERAL CC AND MLO VIEWS WERE OBTAINED UTILIZING 3D TOMOSYNTHESIS AND R2 CAD SOFTWARE     INDICATIONS:  screening     FINDINGS:     No suspicious mass, area of architectural distortion or suspicious microcalcification is   identified.      IMPRESSION:     Benign mammogram. Suggest routine mammographic screening.     RECOMMENDATION(S):               ROUTINE MAMMOGRAM AND CLINICAL EVALUATION IN 12 MONTHS.       BIRADS:               DIAGNOSTIC CATEGORY 2--BENIGN FINDING            Past Medical History:   Diagnosis Date   • Arthritis    • Asthma    • Breast CA (HCC)    • Hyperlipemia    • Hypertension    • Limb swelling    • Seasonal allergies    • Shortness of breath      Past Surgical History:   Procedure Laterality Date   • BREAST BIOPSY Bilateral    • CHOLECYSTECTOMY      laporscopic   • COLONOSCOPY     • GALLBLADDER SURGERY       Family History   Problem Relation Age of Onset   • Osteoporosis Mother    • Stroke Father      Social History     Tobacco Use   • Smoking status: Never   Vaping Use   • Vaping Use: Never used   Substance Use Topics   • Alcohol use: Never           Review of Systems   Constitutional: Negative for fatigue and fever.    HENT: Negative for sore throat and trouble swallowing.    Eyes: Negative for pain and visual disturbance.   Respiratory: Negative for cough and shortness of breath.    Cardiovascular: Negative for chest pain and leg swelling.   Gastrointestinal: Negative for abdominal pain and vomiting.   Endocrine: Negative for cold intolerance and heat intolerance.   Musculoskeletal: Negative for arthralgias and joint swelling.   Skin: Negative for rash and wound.   Neurological: Negative for speech difficulty and numbness.   Psychiatric/Behavioral: Negative for confusion. The patient is not nervous/anxious.        Vitals:    01/09/23 1021   Resp: 18   Weight: 99.8 kg (220 lb)   Height: 157.5 cm (62\")       Physical Exam  Vitals and nursing note reviewed.   Constitutional:       Appearance: Normal appearance.   HENT:      Head: Normocephalic and atraumatic.   Eyes:      Extraocular Movements: Extraocular movements intact.      Pupils: Pupils are equal, round, and reactive to light.   Cardiovascular:      Pulses: Normal pulses.   Pulmonary:      Effort: Pulmonary effort is normal. No accessory muscle usage or respiratory distress.   Chest:   Breasts:     Right: Normal. No inverted nipple, mass, nipple discharge or skin change.      Left: Normal. No inverted nipple, mass, nipple discharge or skin change.   Abdominal:      General: Abdomen is flat.      Palpations: Abdomen is soft.      Tenderness: There is no abdominal tenderness. There is no guarding.   Musculoskeletal:         General: No swelling, tenderness or deformity.      Cervical back: Neck supple.   Lymphadenopathy:      Upper Body:      Right upper body: No supraclavicular or axillary adenopathy.      Left upper body: No supraclavicular or axillary adenopathy.   Skin:     General: Skin is warm and dry.   Neurological:      General: No focal deficit present.      Mental Status: She is alert and oriented to person, place, and time.   Psychiatric:         Mood and Affect:  Mood normal.         Thought Content: Thought content normal.             Diagnoses and all orders for this visit:    1. Breast screening (Primary)  -     Mammo Screening Bilateral With CAD; Future    2. Encounter for screening mammogram for malignant neoplasm of breast  -     Mammo Screening Bilateral With CAD; Future     Followup in one year with mammogram

## 2023-01-09 ENCOUNTER — OFFICE VISIT (OUTPATIENT)
Dept: SURGERY | Facility: CLINIC | Age: 73
End: 2023-01-09
Payer: MEDICARE

## 2023-01-09 VITALS — WEIGHT: 220 LBS | BODY MASS INDEX: 40.48 KG/M2 | HEIGHT: 62 IN | RESPIRATION RATE: 18 BRPM

## 2023-01-09 DIAGNOSIS — Z12.31 ENCOUNTER FOR SCREENING MAMMOGRAM FOR MALIGNANT NEOPLASM OF BREAST: ICD-10-CM

## 2023-01-09 DIAGNOSIS — Z12.39 BREAST SCREENING: Primary | ICD-10-CM

## 2023-01-09 PROCEDURE — 99212 OFFICE O/P EST SF 10 MIN: CPT | Performed by: SURGERY

## 2023-01-09 RX ORDER — ERGOCALCIFEROL 1.25 MG/1
CAPSULE ORAL
COMMUNITY
Start: 2023-01-06

## 2023-05-19 ENCOUNTER — TRANSCRIBE ORDERS (OUTPATIENT)
Dept: ADMINISTRATIVE | Facility: HOSPITAL | Age: 73
End: 2023-05-19
Payer: MEDICARE

## 2023-05-19 DIAGNOSIS — Z12.31 SCREENING MAMMOGRAM FOR BREAST CANCER: Primary | ICD-10-CM

## 2023-06-09 ENCOUNTER — HOSPITAL ENCOUNTER (EMERGENCY)
Facility: HOSPITAL | Age: 73
Discharge: HOME | End: 2023-06-09
Attending: EMERGENCY MEDICINE
Payer: MEDICARE

## 2023-06-09 VITALS
RESPIRATION RATE: 18 BRPM | HEIGHT: 62 IN | OXYGEN SATURATION: 99 % | SYSTOLIC BLOOD PRESSURE: 112 MMHG | DIASTOLIC BLOOD PRESSURE: 65 MMHG | HEART RATE: 60 BPM | WEIGHT: 219.58 LBS | BODY MASS INDEX: 40.41 KG/M2 | TEMPERATURE: 98.4 F

## 2023-06-09 DIAGNOSIS — I10 HYPERTENSION, UNSPECIFIED TYPE: ICD-10-CM

## 2023-06-09 DIAGNOSIS — J01.90 ACUTE SINUSITIS, RECURRENCE NOT SPECIFIED, UNSPECIFIED LOCATION: Primary | ICD-10-CM

## 2023-06-09 PROCEDURE — 99282 EMERGENCY DEPT VISIT SF MDM: CPT

## 2023-06-09 NOTE — ED NOTES
Pt reports she started taking prednisone, clonidine, guafenesin and her water pill was changed from HCTZ to Chlorthaildone 25 mg yesterday she states she is being treated for sinus infection , she reports she has macular disease and always has blurred vision.

## 2023-06-09 NOTE — ED PROVIDER NOTES
Time: 4:18 PM EDT  Date of encounter:  6/9/2023  Independent Historian/Clinical History and Information was obtained by:   Patient  Chief Complaint   Patient presents with    Hypertension       History is limited by: N/A    History of Present Illness:  Patient is a 73 y.o. year old female who presents to the emergency department for evaluation of hypertension. Patient has been having episodes of elevated blood pressure. She reports readings from the 150's to 180's. She denies any chest pain, headache, neck pain, numbness, or weakness. Patient notes blurred vision at baseline due to macular disease, and she denies any new or worsening vision changes. She saw her PCP yesterday and was given multiple medications, including Prednisone, however the majority were to treat an upper respiratory and sinus infection. Patient endorses congestion and productive cough. Her new blood pressure medication added was clonidine, and she states she was told to take the medication if her blood pressure exceeded 180 systolic. She notes she last took the Clonidine around noon today. She has been taking Losartan and Hydrochlorothiazide to manage her blood pressure, but her PCP also changed her Hydrochlorothiazide to Chlorthalidone.    HPI    Patient Care Team  Primary Care Provider: Cecily Marc    Past Medical History:     Allergies   Allergen Reactions    Contrast Dye (Echo Or Unknown Ct/Mr) Rash     Past Medical History:   Diagnosis Date    Arthritis     Asthma     Breast CA     Hyperlipemia     Hypertension     Limb swelling     Seasonal allergies     Shortness of breath      Past Surgical History:   Procedure Laterality Date    BREAST BIOPSY Bilateral     CHOLECYSTECTOMY      laporscopic    COLONOSCOPY      GALLBLADDER SURGERY       Family History   Problem Relation Age of Onset    Osteoporosis Mother     Stroke Father        Home Medications:  Prior to Admission medications    Medication Sig Start Date End Date Taking? Authorizing  "Provider   albuterol sulfate  (90 Base) MCG/ACT inhaler     Ruben Huff MD   ALLERGIST TRAY 1CC 27GX1/2\" 27G X 1/2\" 1 ML kit use as directed for allergy injection administration 5/21/21   Ruben Huff MD   amoxicillin (AMOXIL) 500 MG capsule amoxicillin 500 mg oral capsule Take Four Capsules One Hour Prior To Dental Procedure. 2/22/2021  Active 2/22/21   Ruben Huff MD   amoxicillin-clavulanate (AUGMENTIN) 875-125 MG per tablet TAKE ONE TABLET BY MOUTH TWICE DAILY FOR 10 DAYS 10/31/22   Ruben Huff MD   atorvastatin (LIPITOR) 10 MG tablet Take 10 mg by mouth every night at bedtime. 3/25/21   Ruben Huff MD   Biotin 06545 MCG tablet dispersible     Ruben Huff MD   chlorthalidone (HYGROTON) 25 MG tablet     Ruben Huff MD   Cholecalciferol 25 MCG (1000 UT) capsule     Ruben Huff MD   cyclobenzaprine (FLEXERIL) 10 MG tablet cyclobenzaprine 10 mg oral tablet take 1 tablet by oral route once a day (at bedtime) 12/7/2020  Active 12/7/20   Ruben Huff MD   febuxostat (ULORIC) 40 MG tablet Take 40 mg by mouth Daily. 10/10/22   Ruben Huff MD   fluticasone (Flonase Sensimist) 27.5 MCG/SPRAY nasal spray     Ruben Hfuf MD   fluticasone (FLONASE) 50 MCG/ACT nasal spray 2 sprays by Each Nare route Daily. 6/10/21   Ruben Huff MD   hydroCHLOROthiazide (HYDRODIURIL) 12.5 MG tablet Take 12.5 mg by mouth Daily. 5/24/21   Ruben Huff MD   losartan (COZAAR) 100 MG tablet Take 100 mg by mouth Daily. 4/30/21   Ruben Huff MD   losartan (COZAAR) 50 MG tablet Take 50 mg by mouth Daily. 4/20/21   Ruben Huff MD   meloxicam (MOBIC) 15 MG tablet meloxicam 15 mg oral tablet take 1 tablet (15 mg) by oral route once daily with food 12/7/2020  Active 12/7/20   Ruben Huff MD   montelukast (SINGULAIR) 10 MG tablet TAKE ONE TABLET BY MOUTH ONCE A DAY AT NIGHT 6/10/21   Provider, " "MD Ruben   predniSONE (DELTASONE) 20 MG tablet TAKE ONE TABLET BY MOUTH DAILY FOR 3 DAYS 10/31/22   Ruben Huff MD   predniSONE (DELTASONE) 50 MG tablet 50 mg. 5/31/22   Ruben Huff MD   vitamin D (ERGOCALCIFEROL) 1.25 MG (12636 UT) capsule capsule  1/6/23   Ruben Huff MD   vitamin D3 125 MCG (5000 UT) capsule capsule TAKE TWO CAPSULES BY MOUTH ONCE A DAY 5/21/21   Ruben Huff MD        Social History:   Social History     Tobacco Use    Smoking status: Never   Vaping Use    Vaping Use: Never used   Substance Use Topics    Alcohol use: Never         Review of Systems:  Review of Systems   Constitutional:  Negative for chills and fever.   HENT:  Positive for congestion. Negative for ear pain and sore throat.    Eyes:  Negative for pain and visual disturbance (new or worsening).   Respiratory:  Positive for cough (productive). Negative for chest tightness and shortness of breath.    Cardiovascular:  Negative for chest pain.   Gastrointestinal:  Negative for abdominal pain, diarrhea, nausea and vomiting.   Genitourinary:  Negative for flank pain and hematuria.   Musculoskeletal:  Negative for joint swelling and neck pain.   Skin:  Negative for pallor.   Neurological:  Negative for seizures, weakness, numbness and headaches.   All other systems reviewed and are negative.     Physical Exam:  /65   Pulse 60   Temp 98.4 °F (36.9 °C) (Oral)   Resp 18   Ht 157.5 cm (62\")   Wt 99.6 kg (219 lb 9.3 oz)   SpO2 99%   BMI 40.16 kg/m²     Physical Exam  Vitals and nursing note reviewed.   Constitutional:       General: She is not in acute distress.     Appearance: Normal appearance. She is not toxic-appearing.   HENT:      Head: Normocephalic and atraumatic.      Nose: Congestion (mild) present.      Mouth/Throat:      Mouth: Mucous membranes are moist.   Eyes:      General: No scleral icterus.  Cardiovascular:      Rate and Rhythm: Normal rate and regular rhythm.      " Pulses: Normal pulses.      Heart sounds: Normal heart sounds.   Pulmonary:      Effort: Pulmonary effort is normal. No respiratory distress.      Breath sounds: Normal breath sounds.   Abdominal:      General: Abdomen is flat.      Palpations: Abdomen is soft.      Tenderness: There is no abdominal tenderness.   Musculoskeletal:         General: Normal range of motion.      Cervical back: Normal range of motion and neck supple.   Skin:     General: Skin is warm and dry.   Neurological:      Mental Status: She is alert and oriented to person, place, and time. Mental status is at baseline.                Procedures:  Procedures      Medical Decision Making:      Comorbidities that affect care:    Hyperlipidemia, Asthma, Hypertension    External Notes reviewed:    Previous Clinic Note: Outpatient surgery visit      The following orders were placed and all results were independently analyzed by me:  No orders of the defined types were placed in this encounter.      Medications Given in the Emergency Department:  Medications - No data to display     ED Course:    The patient was initially evaluated in the triage area where orders were placed. The patient was later dispositioned by Simone Magana MD.      The patient was advised to stay for completion of workup which includes but is not limited to communication of labs and radiological results, reassessment and plan. The patient was advised that leaving prior to disposition by a provider could result in critical findings that are not communicated to the patient.     ED Course as of 06/11/23 0923   Fri Jun 09, 2023   1618   --- PROVIDER IN TRIAGE NOTE ---    Patient was seen and evaluated in triage by RAJI Delcid.  Orders were written and the patient is currently awaiting disposition.   [MS]   2313 Patient blood pressure is normalized while waiting in the emergency department.  She is completely asymptomatic, no headache, no chest pain, no numbness or  weakness.  We reviewed her medications we discussed appropriate dosing at home.  We discussed techniques to assist with her continued nasal and sinus congestion.  We discussed timing of checking her blood pressure at home.  Once in the morning and an additional time if she is symptomatic with any of the previously described symptoms.  She is comfortable with plan for discharge home and will follow-up with her PCP.  We discussed return precautions including worsening symptoms or any additional concerns. [JS]      ED Course User Index  [JS] Simone Magana MD  [MS] Franny Rivers APRN       Labs:    Lab Results (last 24 hours)       ** No results found for the last 24 hours. **             Imaging:    No Radiology Exams Resulted Within Past 24 Hours      Differential Diagnosis and Discussion:      Metabolic: Differential diagnosis includes but is not limited to hypertension, hyperglycemia, hyperkalemia, hypocalcemia, metabolic acidosis, hypokalemia, hypoglycemia, malnutrition, hypothyroidism, hyperthyroidism, and adrenal insufficiency.         [facility]           Patient Care Considerations:    NARCOTICS: I considered prescribing opiate pain medication as an outpatient, however no pain control required in the ED      Consultants/Shared Management Plan:    None    Social Determinants of Health:    Patient is independent, reliable, and has access to care.       Disposition and Care Coordination:    Discharged: The patient is suitable and stable for discharge with no need for consideration of observation or admission.    I have explained the patient´s condition, diagnoses and treatment plan based on the information available to me at this time. I have answered questions and addressed any concerns. The patient has a good  understanding of the patient´s diagnosis, condition, and treatment plan as can be expected at this point. The vital signs have been stable. The patient´s condition is stable and appropriate for  discharge from the emergency department.      The patient will pursue further outpatient evaluation with the primary care physician or other designated or consulting physician as outlined in the discharge instructions. They are agreeable to this plan of care and follow-up instructions have been explained in detail. The patient has received these instructions in written format and have expressed an understanding of the discharge instructions. The patient is aware that any significant change in condition or worsening of symptoms should prompt an immediate return to this or the closest emergency department or call to 911.  I have explained discharge medications and the need for follow up with the patient/caretakers. This was also printed in the discharge instructions. Patient was discharged with the following medications and follow up:      Medication List      No changes were made to your prescriptions during this visit.      Cecily Marc  [address] [place] [address]  [phone]    Schedule an appointment as soon as possible for a visit          Final diagnoses:   Acute sinusitis, recurrence not specified, unspecified location   Hypertension, unspecified type        ED Disposition       ED Disposition   Discharge    Condition   Stable    Comment   --               This medical record created using voice recognition software.    Documentation assistance provided by Humberto Callaway acting as scribe for Simone Magana MD. Information recorded by the scribe was done at my direction and has been verified and validated by me.           Humberto Callaway  06/09/23 [id number]       Simone Magana MD  06/11/23 [id number]

## 2023-11-20 ENCOUNTER — HOSPITAL ENCOUNTER (OUTPATIENT)
Dept: MAMMOGRAPHY | Facility: HOSPITAL | Age: 73
Discharge: HOME | End: 2023-11-20
Admitting: SURGERY
Payer: MEDICARE

## 2023-11-20 DIAGNOSIS — Z12.31 SCREENING MAMMOGRAM FOR BREAST CANCER: ICD-10-CM

## 2023-11-20 PROCEDURE — 77063 BREAST TOMOSYNTHESIS BI: CPT

## 2023-11-20 PROCEDURE — 77067 SCR MAMMO BI INCL CAD: CPT

## 2024-05-21 ENCOUNTER — TELEPHONE (OUTPATIENT)
Dept: SURGERY | Facility: CLINIC | Age: 74
End: 2024-05-21
Payer: MEDICARE

## 2024-05-21 NOTE — TELEPHONE ENCOUNTER
PT CALLED BACK TO SCHEDULE AN APPT, PT IS SUPPOSED TO F/U WITH A MAMMO, CAN YOU PUT IN THE ORDER FOR THE MAMMO PER PT HER MAMMO WILL NOT BE APPROVED UNTIL NOVEMBER SO I SCHEDULED HER APPT IN NOV PER HER REQ.

## 2024-05-21 NOTE — TELEPHONE ENCOUNTER
Caller: NADEGE ESCUDERO    Relationship to patient: SELF    Best call back number: [phone] (home)       Patient is needing: TO SCHEDULE ANNUAL BREAST F/U AND MAMMO.    LAST MAMMO NOV 2023

## 2024-05-22 DIAGNOSIS — Z12.39 ENCOUNTER FOR SCREENING FOR MALIGNANT NEOPLASM OF BREAST, UNSPECIFIED SCREENING MODALITY: Primary | ICD-10-CM

## 2024-05-22 DIAGNOSIS — Z12.31 ENCOUNTER FOR SCREENING MAMMOGRAM FOR MALIGNANT NEOPLASM OF BREAST: ICD-10-CM

## 2024-11-27 ENCOUNTER — HOSPITAL ENCOUNTER (OUTPATIENT)
Dept: MAMMOGRAPHY | Facility: HOSPITAL | Age: 74
Discharge: HOME | End: 2024-11-27
Admitting: SURGERY
Payer: MEDICARE

## 2024-11-27 DIAGNOSIS — Z12.31 ENCOUNTER FOR SCREENING MAMMOGRAM FOR MALIGNANT NEOPLASM OF BREAST: ICD-10-CM

## 2024-11-27 DIAGNOSIS — Z12.39 ENCOUNTER FOR SCREENING FOR MALIGNANT NEOPLASM OF BREAST, UNSPECIFIED SCREENING MODALITY: ICD-10-CM

## 2024-11-27 PROCEDURE — 77063 BREAST TOMOSYNTHESIS BI: CPT

## 2024-11-27 PROCEDURE — 77067 SCR MAMMO BI INCL CAD: CPT
